# Patient Record
Sex: MALE | Race: WHITE | Employment: FULL TIME | URBAN - METROPOLITAN AREA
[De-identification: names, ages, dates, MRNs, and addresses within clinical notes are randomized per-mention and may not be internally consistent; named-entity substitution may affect disease eponyms.]

---

## 2019-04-13 ENCOUNTER — HOSPITAL ENCOUNTER (EMERGENCY)
Facility: CLINIC | Age: 54
Discharge: HOME | End: 2019-04-13
Attending: NURSE PRACTITIONER | Admitting: NURSE PRACTITIONER

## 2019-04-13 VITALS
TEMPERATURE: 97.8 F | DIASTOLIC BLOOD PRESSURE: 91 MMHG | SYSTOLIC BLOOD PRESSURE: 126 MMHG | OXYGEN SATURATION: 95 % | RESPIRATION RATE: 20 BRPM

## 2019-04-13 DIAGNOSIS — F10.920 ALCOHOL INTOXICATION, UNCOMPLICATED (H): ICD-10-CM

## 2019-04-13 LAB — ALCOHOL BREATH TEST: 0.26 (ref 0–0.01)

## 2019-04-13 PROCEDURE — 82075 ASSAY OF BREATH ETHANOL: CPT | Performed by: NURSE PRACTITIONER

## 2019-04-13 PROCEDURE — 99283 EMERGENCY DEPT VISIT LOW MDM: CPT | Mod: 25 | Performed by: NURSE PRACTITIONER

## 2019-04-13 PROCEDURE — 99283 EMERGENCY DEPT VISIT LOW MDM: CPT | Mod: Z6 | Performed by: NURSE PRACTITIONER

## 2019-04-13 NOTE — ED AVS SNAPSHOT
[facility] Emergency Department  [address] [place] [address]  Phone:  [phone]  Fax:  [phone]                                    Dagoberto Garcia   MRN: [id number]    Department:  [facility] Emergency Department   Date of Visit:  4/13/2019           After Visit Summary Signature Page    I have received my discharge instructions, and my questions have been answered. I have discussed any challenges I see with this plan with the nurse or doctor.    ..........................................................................................................................................  Patient/Patient Representative Signature      ..........................................................................................................................................  Patient Representative Print Name and Relationship to Patient    ..................................................               ................................................  Date                                   Time    ..........................................................................................................................................  Reviewed by Signature/Title    ...................................................              ..............................................  Date                                               Time          22EPIC Rev 08/18        From: Damon Casas  To: Uyen Garzon MD  Sent: 7/24/2018 2:32 PM CDT  Subject: Antibiotic    Hi Doc,     I have a MRSA bump that is really infected on my finger. I have been soaking it all day in warm water and it hurts bad. I have no way to make it into you or an urgent care. My car is in the shop until friday. I need the mrsa antibiotic you always give me please. I can walk to [facility] is less then a block away. if you want to see it, I can take a picture and send it if need be. But we both know what it is and if it was hurting bad I would deal with it but it is swollen and infected. Please send script to [facility] on [address] in [place] if you can. Thank you

## 2019-04-14 NOTE — ED TRIAGE NOTES
Was walking home from the bar, an officer stopped him, he told the officer he wanted to end his life. . Patient denies ever saying he wanted to end his life.

## 2019-04-14 NOTE — ED PROVIDER NOTES
"  History     Chief Complaint   Patient presents with     Alcohol Intoxication     The history is provided by the patient.     Dagoberto Garcia is a 53 year old male who presents to the emergency department via EMS with alcohol intoxication. The patient reports having 5 beers tonight at [place] [employer] and was walking home when someone saw him stumbling and they called the police for a welfare check on him. The  reports that the patient stated he wanted to end his life. The patient currently denies saying anything about wanting to kill himself. He states \"all I said was that I wanted to go home\". Patient denies any current suicidal ideation. He does not have a plan to harm himself. The patient lives at home, but frequently stays with his dad. He reports no access to weapons in his house. The patient says he does not drink alcohol daily. He was recently sober for 2 years. He denies the use of any other drugs.     Allergies:  No Known Allergies    Problem List:    There are no active problems to display for this patient.       Past Medical History:    History reviewed. No pertinent past medical history.    Past Surgical History:    History reviewed. No pertinent surgical history.    Family History:    No family history on file.    Social History:  Marital Status:  Single [1]  Social History     Tobacco Use     Smoking status: Current Every Day Smoker     Packs/day: 0.50   Substance Use Topics     Alcohol use: Yes     Comment: occ     Drug use: No        Medications:      ALEVE OR         Review of Systems   All other systems reviewed and are negative.      Physical Exam   BP: (!) 126/91  Heart Rate: 103  Temp: 97.8  F (36.6  C)  Resp: 20  SpO2: 95 %      Physical Exam   Constitutional: He appears well-developed and well-nourished. No distress.   HENT:   Head: Normocephalic and atraumatic.   Right Ear: External ear normal.   Left Ear: External ear normal.   Eyes: Pupils are equal, round, and reactive to " light. Right eye exhibits no discharge. Left eye exhibits no discharge. No scleral icterus.   Neck: Normal range of motion. Neck supple.   Cardiovascular: Normal rate, regular rhythm and normal heart sounds.   No murmur heard.  Pulmonary/Chest: Effort normal. No respiratory distress.   Musculoskeletal: Normal range of motion. He exhibits no edema, tenderness or deformity.   Neurological: He is alert. No cranial nerve deficit.   Skin: Skin is warm and dry. No rash noted. He is not diaphoretic. No erythema. No pallor.   Psychiatric: He has a normal mood and affect. His behavior is normal. Thought content normal. His mood appears not anxious. His speech is slurred (mild). He is not agitated and not aggressive. He expresses no homicidal and no suicidal ideation. He expresses no suicidal plans and no homicidal plans.   Appropriate affect.   Nursing note and vitals reviewed.      ED Course        Procedures    Results for orders placed or performed during the hospital encounter of 04/13/19 (from the past 24 hour(s))   Alcohol breath test POCT   Result Value Ref Range    Alcohol Breath Test 0.26 (A) 0.00 - 0.01       Medications - No data to display    Assessments & Plan (with Medical Decision Making)  Dagoberto is a 53-year-old male, history of alcohol abuse, presents to the emergency department today via EMS as requested by police.  Patient was walking home from the lesion, he was stumbling but did not fall, a welfare check was requested by a passerby.  Please reported that patient stated he wanted to kill himself.  Patient adamantly denies this as noted in HPI.  Patient denies any history of suicidal ideation or intent to harm himself or any plan.  Patient on arrival here has an alcohol breath test 2.26, his gait is slightly unsteady, his speech is mildly slurred.  Patient was monitored here for 2 hours, he was given water to drink, his condition improved to the point that his gait was steady, he remained alert and oriented  with a GCS of 15 and was able to call a cab to bring him home.  Patient does live independently, at this time I feel he is clinically sober and able to care for himself.  Patient denies any offer for alcohol treatment.  Reasons to return to the emergency department were discussed in detail.  Patient is agreeable to plan of care and was discharged in stable condition.     I have reviewed the nursing notes.    I have reviewed the findings, diagnosis, plan and need for follow up with the patient.       Medication List      There are no discharge medications for this visit.         Final diagnoses:   Alcohol intoxication, uncomplicated (H)     This document serves as a record of services personally performed by Yelitza Hadley AP*. It was created on their behalf by Oksana Ndiaye, a trained medical scribe. The creation of this record is based on the provider's personal observations and the statements of the patient. This document has been checked and approved by the attending provider.  Note: Chart documentation done in part with Dragon Voice Recognition software. Although reviewed after completion, some word and grammatical errors may remain.    4/13/2019   [facility] EMERGENCY DEPARTMENT     Yelitza Hadley, REZA CNP  04/13/19 213

## 2019-04-14 NOTE — ED NOTES
Had patient empty his pockets, and belt removed, and all belonging put in drawer 5. Then Yana Hadley came in assessed patient. She said he is not suicidal and can keep his clothes on.

## 2019-04-14 NOTE — ED NOTES
Patient called [facility] to come pick him up. They will be here in about 45 min.

## 2019-05-30 ENCOUNTER — DOCUMENTATION ONLY (OUTPATIENT)
Dept: LAB | Facility: CLINIC | Age: 54
End: 2019-05-30

## 2019-05-30 NOTE — PROGRESS NOTES
Called patient and left detailed message that his appt was canceled prior to appointment. Rescheduled for after he sees Dr. Banks.    Beena Fuentes RN, [facility]

## 2019-05-30 NOTE — PROGRESS NOTES
Patient is new to us. Left a VM to cancel lab appt on 6/3 if that is okay with him since the provider will want to discuss concerns first.   Lorna Noriega RN

## 2021-05-09 ENCOUNTER — HEALTH MAINTENANCE LETTER (OUTPATIENT)
Age: 56
End: 2021-05-09

## 2021-10-24 ENCOUNTER — HEALTH MAINTENANCE LETTER (OUTPATIENT)
Age: 56
End: 2021-10-24

## 2022-06-05 ENCOUNTER — HEALTH MAINTENANCE LETTER (OUTPATIENT)
Age: 57
End: 2022-06-05

## 2022-10-16 ENCOUNTER — HEALTH MAINTENANCE LETTER (OUTPATIENT)
Age: 57
End: 2022-10-16

## 2023-06-17 ENCOUNTER — HEALTH MAINTENANCE LETTER (OUTPATIENT)
Age: 58
End: 2023-06-17